# Patient Record
Sex: MALE | Race: WHITE | NOT HISPANIC OR LATINO | URBAN - METROPOLITAN AREA
[De-identification: names, ages, dates, MRNs, and addresses within clinical notes are randomized per-mention and may not be internally consistent; named-entity substitution may affect disease eponyms.]

---

## 2017-03-02 ENCOUNTER — GENERIC CONVERSION - ENCOUNTER (OUTPATIENT)
Dept: OTHER | Facility: OTHER | Age: 67
End: 2017-03-02

## 2017-08-04 ENCOUNTER — DOCTOR'S OFFICE (OUTPATIENT)
Dept: URBAN - METROPOLITAN AREA CLINIC 136 | Facility: CLINIC | Age: 67
Setting detail: OPHTHALMOLOGY
End: 2017-08-04
Payer: COMMERCIAL

## 2017-08-04 DIAGNOSIS — H52.4: ICD-10-CM

## 2017-08-04 DIAGNOSIS — H35.3131: ICD-10-CM

## 2017-08-04 DIAGNOSIS — H25.13: ICD-10-CM

## 2017-08-04 DIAGNOSIS — H52.03: ICD-10-CM

## 2017-08-04 DIAGNOSIS — D31.32: ICD-10-CM

## 2017-08-04 DIAGNOSIS — H52.223: ICD-10-CM

## 2017-08-04 PROCEDURE — 92004 COMPRE OPH EXAM NEW PT 1/>: CPT | Performed by: OPTOMETRIST

## 2017-08-04 ASSESSMENT — REFRACTION_MANIFEST
OS_VA1: 20/
OD_VA1: 20/
OS_VA2: 20/
OD_VA3: 20/
OD_VA2: 20/
OD_VA3: 20/
OU_VA: 20/
OD_VA1: 20/
OS_VA1: 20/
OS_VA3: 20/
OS_VA2: 20/
OS_VA3: 20/
OD_VA2: 20/
OU_VA: 20/

## 2017-08-04 ASSESSMENT — VISUAL ACUITY
OD_BCVA: 20/20
OS_BCVA: 20/20-2

## 2017-08-04 ASSESSMENT — REFRACTION_AUTOREFRACTION
OS_AXIS: 096
OS_SPHERE: +1.25
OS_CYLINDER: -0.75
OD_SPHERE: +1.75
OD_CYLINDER: -1.25
OD_AXIS: 102

## 2017-08-04 ASSESSMENT — REFRACTION_CURRENTRX
OS_OVR_VA: 20/
OD_AXIS: 094
OD_CYLINDER: -1.00
OD_VPRISM_DIRECTION: PROGS
OS_SPHERE: +1.25
OD_ADD: -0.25
OS_OVR_VA: 20/
OD_SPHERE: +1.50
OS_CYLINDER: -0.50
OS_VPRISM_DIRECTION: PROGS
OS_AXIS: 087
OD_OVR_VA: 20/
OS_OVR_VA: 20/
OS_ADD: +0.25

## 2017-08-04 ASSESSMENT — REFRACTION_OUTSIDERX
OD_VA2: 20/20
OU_VA: 20/20
OS_VA3: 20/
OS_VA1: 20/20
OS_SPHERE: +1.50
OS_CYLINDER: -0.50
OS_ADD: +2.50
OS_VA2: 20/20
OD_SPHERE: +1.75
OD_CYLINDER: -1.00
OS_AXIS: 090
OD_ADD: +2.50
OD_AXIS: 095
OD_VA3: 20/
OD_VA1: 20/20

## 2017-08-04 ASSESSMENT — CONFRONTATIONAL VISUAL FIELD TEST (CVF)
OS_FINDINGS: FULL
OD_FINDINGS: FULL

## 2017-08-04 ASSESSMENT — KERATOMETRY
OD_K1POWER_DIOPTERS: 42.25
OD_K2POWER_DIOPTERS: 41.75
OD_AXISANGLE_DEGREES: 174

## 2017-08-04 ASSESSMENT — SPHEQUIV_DERIVED
OS_SPHEQUIV: 0.875
OD_SPHEQUIV: 1.125

## 2017-08-04 ASSESSMENT — AXIALLENGTH_DERIVED: OD_AL: 23.7051

## 2017-08-07 ENCOUNTER — OPTICAL OFFICE (OUTPATIENT)
Dept: URBAN - METROPOLITAN AREA CLINIC 143 | Facility: CLINIC | Age: 67
Setting detail: OPHTHALMOLOGY
End: 2017-08-07
Payer: COMMERCIAL

## 2017-08-07 DIAGNOSIS — H52.223: ICD-10-CM

## 2017-08-07 PROCEDURE — V2750 ANTI-REFLECTIVE COATING: HCPCS | Performed by: OPTOMETRIST

## 2017-08-07 PROCEDURE — V2784 LENS POLYCARB OR EQUAL: HCPCS | Performed by: OPTOMETRIST

## 2017-08-07 PROCEDURE — V2020 VISION SVCS FRAMES PURCHASES: HCPCS | Performed by: OPTOMETRIST

## 2017-08-07 PROCEDURE — V2744 TINT PHOTOCHROMATIC LENS/ES: HCPCS | Performed by: OPTOMETRIST

## 2017-08-07 PROCEDURE — V2781 PROGRESSIVE LENS PER LENS: HCPCS | Performed by: OPTOMETRIST

## 2020-02-25 ENCOUNTER — RX ONLY (RX ONLY)
Age: 70
End: 2020-02-25

## 2020-02-25 ENCOUNTER — DOCTOR'S OFFICE (OUTPATIENT)
Dept: URBAN - METROPOLITAN AREA CLINIC 136 | Facility: CLINIC | Age: 70
Setting detail: OPHTHALMOLOGY
End: 2020-02-25
Payer: COMMERCIAL

## 2020-02-25 DIAGNOSIS — H25.13: ICD-10-CM

## 2020-02-25 DIAGNOSIS — D31.32: ICD-10-CM

## 2020-02-25 DIAGNOSIS — H52.4: ICD-10-CM

## 2020-02-25 DIAGNOSIS — H35.3131: ICD-10-CM

## 2020-02-25 PROBLEM — H52.03 HYPEROPIA, ASTIGMATISM, PRESBYOPIA OU ; BOTH EYES: Status: ACTIVE | Noted: 2017-08-04

## 2020-02-25 PROBLEM — H52.223 HYPEROPIA, ASTIGMATISM, PRESBYOPIA OU ; BOTH EYES: Status: ACTIVE | Noted: 2017-08-04

## 2020-02-25 PROCEDURE — 92250 FUNDUS PHOTOGRAPHY W/I&R: CPT | Performed by: OPTOMETRIST

## 2020-02-25 PROCEDURE — 92014 COMPRE OPH EXAM EST PT 1/>: CPT | Performed by: OPTOMETRIST

## 2020-02-25 PROCEDURE — 92015 DETERMINE REFRACTIVE STATE: CPT | Performed by: OPTOMETRIST

## 2020-02-25 PROCEDURE — 92134 CPTRZ OPH DX IMG PST SGM RTA: CPT | Performed by: OPTOMETRIST

## 2020-02-25 ASSESSMENT — REFRACTION_CURRENTRX
OD_VPRISM_DIRECTION: PROGS
OS_AXIS: 090
OS_ADD: +2.50
OD_CYLINDER: -1.00
OS_OVR_VA: 20/
OD_AXIS: 095
OS_VPRISM_DIRECTION: PROGS
OS_CYLINDER: -0.50
OS_SPHERE: +1.50
OD_ADD: +2.50
OD_SPHERE: +1.75
OD_OVR_VA: 20/

## 2020-02-25 ASSESSMENT — REFRACTION_MANIFEST
OS_AXIS: 090
OS_ADD: +2.50
OD_VA1: 20/20
OD_SPHERE: +2.00
OS_VA2: 20/20
OS_VA1: 20/20
OD_ADD: +2.50
OU_VA: 20/20
OS_CYLINDER: -1.00
OD_AXIS: 095
OS_SPHERE: +1.75
OD_VA2: 20/20
OD_CYLINDER: -1.00

## 2020-02-25 ASSESSMENT — SPHEQUIV_DERIVED
OD_SPHEQUIV: 1.75
OS_SPHEQUIV: 1.625
OD_SPHEQUIV: 1.5
OS_SPHEQUIV: 1.25

## 2020-02-25 ASSESSMENT — KERATOMETRY
OD_K2POWER_DIOPTERS: 41.75
OD_AXISANGLE_DEGREES: 174
OD_K1POWER_DIOPTERS: 42.25

## 2020-02-25 ASSESSMENT — REFRACTION_AUTOREFRACTION
OD_AXIS: 100
OS_SPHERE: +2.25
OD_SPHERE: +2.25
OS_CYLINDER: -1.25
OS_AXIS: 093
OD_CYLINDER: -1.00

## 2020-02-25 ASSESSMENT — VISUAL ACUITY
OS_BCVA: 20/20
OD_BCVA: 20/20

## 2020-02-25 ASSESSMENT — CONFRONTATIONAL VISUAL FIELD TEST (CVF)
OD_FINDINGS: FULL
OS_FINDINGS: FULL

## 2020-02-25 ASSESSMENT — AXIALLENGTH_DERIVED
OD_AL: 23.4618
OD_AL: 23.5585

## 2021-02-22 ENCOUNTER — IMMUNIZATIONS (OUTPATIENT)
Dept: FAMILY MEDICINE CLINIC | Facility: HOSPITAL | Age: 71
End: 2021-02-22

## 2021-02-22 DIAGNOSIS — Z23 ENCOUNTER FOR IMMUNIZATION: Primary | ICD-10-CM

## 2021-02-22 PROCEDURE — 0001A SARS-COV-2 / COVID-19 MRNA VACCINE (PFIZER-BIONTECH) 30 MCG: CPT

## 2021-02-22 PROCEDURE — 91300 SARS-COV-2 / COVID-19 MRNA VACCINE (PFIZER-BIONTECH) 30 MCG: CPT

## 2021-03-15 ENCOUNTER — IMMUNIZATIONS (OUTPATIENT)
Dept: FAMILY MEDICINE CLINIC | Facility: HOSPITAL | Age: 71
End: 2021-03-15

## 2021-03-15 DIAGNOSIS — Z23 ENCOUNTER FOR IMMUNIZATION: Primary | ICD-10-CM

## 2021-03-15 PROCEDURE — 0002A SARS-COV-2 / COVID-19 MRNA VACCINE (PFIZER-BIONTECH) 30 MCG: CPT

## 2021-03-15 PROCEDURE — 91300 SARS-COV-2 / COVID-19 MRNA VACCINE (PFIZER-BIONTECH) 30 MCG: CPT
